# Patient Record
Sex: FEMALE | Race: WHITE | NOT HISPANIC OR LATINO | URBAN - METROPOLITAN AREA
[De-identification: names, ages, dates, MRNs, and addresses within clinical notes are randomized per-mention and may not be internally consistent; named-entity substitution may affect disease eponyms.]

---

## 2023-04-20 ENCOUNTER — OFFICE VISIT (OUTPATIENT)
Dept: PRIMARY CARE | Facility: CLINIC | Age: 23
End: 2023-04-20
Payer: COMMERCIAL

## 2023-04-20 VITALS
HEIGHT: 69 IN | TEMPERATURE: 96.7 F | OXYGEN SATURATION: 98 % | BODY MASS INDEX: 18.96 KG/M2 | DIASTOLIC BLOOD PRESSURE: 70 MMHG | SYSTOLIC BLOOD PRESSURE: 116 MMHG | HEART RATE: 72 BPM | WEIGHT: 128 LBS

## 2023-04-20 DIAGNOSIS — Z13.21 ENCOUNTER FOR VITAMIN DEFICIENCY SCREENING: ICD-10-CM

## 2023-04-20 DIAGNOSIS — Z30.09 BIRTH CONTROL COUNSELING: ICD-10-CM

## 2023-04-20 DIAGNOSIS — L74.4 ANHYDROSIS: ICD-10-CM

## 2023-04-20 DIAGNOSIS — Z13.220 SCREENING, LIPID: ICD-10-CM

## 2023-04-20 DIAGNOSIS — G89.29 CHRONIC BILATERAL THORACIC BACK PAIN: Primary | ICD-10-CM

## 2023-04-20 DIAGNOSIS — F41.9 ANXIETY: ICD-10-CM

## 2023-04-20 DIAGNOSIS — M54.6 CHRONIC BILATERAL THORACIC BACK PAIN: Primary | ICD-10-CM

## 2023-04-20 PROCEDURE — 99204 OFFICE O/P NEW MOD 45 MIN: CPT | Performed by: FAMILY MEDICINE

## 2023-04-20 RX ORDER — CYCLOBENZAPRINE HCL 5 MG
5 TABLET ORAL NIGHTLY PRN
Qty: 30 TABLET | Refills: 0 | Status: SHIPPED | OUTPATIENT
Start: 2023-04-20 | End: 2023-06-19

## 2023-04-20 RX ORDER — CITALOPRAM 40 MG/1
1 TABLET, FILM COATED ORAL DAILY
COMMUNITY
Start: 2022-11-24 | End: 2024-04-24 | Stop reason: ALTCHOICE

## 2023-04-20 RX ORDER — NORETHINDRONE 0.35 MG/1
1 TABLET ORAL DAILY
Qty: 28 TABLET | Refills: 12 | Status: SHIPPED | OUTPATIENT
Start: 2023-04-20 | End: 2024-03-25

## 2023-04-20 RX ORDER — HYDROXYZINE HYDROCHLORIDE 50 MG/1
TABLET, FILM COATED ORAL
COMMUNITY
Start: 2023-02-03 | End: 2024-04-08 | Stop reason: SDUPTHER

## 2023-04-20 RX ORDER — NAPROXEN 500 MG/1
500 TABLET ORAL 2 TIMES DAILY PRN
Qty: 60 TABLET | Refills: 0 | Status: SHIPPED | OUTPATIENT
Start: 2023-04-20 | End: 2023-07-19

## 2023-04-20 RX ORDER — NORETHINDRONE 0.35 MG/1
1 TABLET ORAL DAILY
COMMUNITY
Start: 2023-01-11 | End: 2023-04-20 | Stop reason: SDUPTHER

## 2023-04-20 ASSESSMENT — ENCOUNTER SYMPTOMS: BACK PAIN: 1

## 2023-04-20 NOTE — PATIENT INSTRUCTIONS
Thoracic pain: Would benefit from OMT advised to schedule here in the office, get x-ray, physical therapy referral, naproxen as needed and Flexeril as needed  Anhidrosis: We will get screening lab work  Birth control: Rx refilled

## 2023-04-20 NOTE — PROGRESS NOTES
"  Assessment     ASSESSMENT/PLAN:      Problem List Items Addressed This Visit    None  Visit Diagnoses       Chronic bilateral thoracic back pain    -  Primary    Relevant Medications    cyclobenzaprine (Flexeril) 5 mg tablet    naproxen (Naprosyn) 500 mg tablet    Other Relevant Orders    XR thoracic spine 2 views    Referral to Physical Therapy    Anxiety        Anhydrosis        Relevant Orders    Tsh With Reflex To Free T4 If Abnormal    CBC and Auto Differential    Comprehensive Metabolic Panel    Screening, lipid        Relevant Orders    Lipid panel    Birth control counseling        Relevant Medications    norethindrone (Micronor) 0.35 mg tablet    Encounter for vitamin deficiency screening        Relevant Orders    Vitamin D 25-Hydroxy,Total            Patient Instructions:  Patient Instructions   Thoracic pain: Would benefit from OMT advised to schedule here in the office, get x-ray, physical therapy referral, naproxen as needed and Flexeril as needed  Anhidrosis: We will get screening lab work  Birth control: Rx refilled      Signed by: Claudia Aguayo,        FUTURE DIRECTION:   Review x-ray and PT recommendations, review labs, review old records    Subjective   SUBJECTIVE:     HPI : Patient is a 22 y.o. female who presents today for the following:     Back Pain   - ongoing for the past 2 years, was playing D2 volley ball in college   - states she was seen by chiropractor   - states that she has pain daily, sometimes affecting sleep   - pain is described achy tightness \"around spine\", located in upper back and sometimes shooting pain that at times seems to migrate to sternum  -Denies numbness, tingling, weakness, recent traumas  - deneis getting x-ray or getting PT  - in the past tried otc NSAIDs, was using muscle relaxer's    Anhidrosis  -States that she was diagnosed in hydrocyst in 2019 had full work-up including blood work and referral to neurology  -States that she noticed improvement since " anxiety is more controlled    Anxiety  - citalopram 40 mg   - hydroxyzine 50mg   - has psych through online     LMP  - was seeing physician online   - was given due to irregular periods   - has not gottne pap smear        Review of Systems   Musculoskeletal:  Positive for back pain.       Past Medical History:   Diagnosis Date    Anhidrosis     Anxiety     Menstrual periods irregular         Past Surgical History:   Procedure Laterality Date    WISDOM TOOTH EXTRACTION          Current Outpatient Medications   Medication Instructions    citalopram (CeleXA) 40 mg tablet 1 tablet, oral, Daily    cyclobenzaprine (FLEXERIL) 5 mg, oral, Nightly PRN    hydrOXYzine HCL (Atarax) 50 mg tablet TAKE 1-2 TABS BY MOUTH AT NIGHT AS NEEDED FOR INSOMNIA    naproxen (NAPROSYN) 500 mg, oral, 2 times daily PRN    norethindrone (MICRONOR) 0.35 mg, oral, Daily        No Known Allergies     Social History     Socioeconomic History    Marital status:      Spouse name: Not on file    Number of children: Not on file    Years of education: Not on file    Highest education level: Not on file   Occupational History    Occupation: [employer]   Tobacco Use    Smoking status: Never    Smokeless tobacco: Never   Vaping Use    Vaping status: Not on file   Substance and Sexual Activity    Alcohol use: Not on file    Drug use: Not on file    Sexual activity: Yes     Partners: Male   Other Topics Concern    Not on file   Social History Narrative    Not on file     Social Determinants of Health     Financial Resource Strain: Not on file   Food Insecurity: Not on file   Transportation Needs: Not on file   Physical Activity: Not on file   Stress: Not on file   Social Connections: Not on file   Intimate Partner Violence: Not on file   Housing Stability: Not on file        Family History   Problem Relation Name Age of Onset    Thyroid disease Mother          Objective     OBJECTIVE:     Vitals:    04/20/23 1016 04/20/23 1019   BP: 116/70  "116/70   Pulse:  72   Temp:  35.9 °C (96.7 °F)   SpO2:  98%   Weight:  58.1 kg (128 lb)   Height:  1.753 m (5' 9\")        Physical Exam  HENT:      Head: Normocephalic and atraumatic.      Nose: Nose normal.      Mouth/Throat:      Mouth: Mucous membranes are moist.   Eyes:      Pupils: Pupils are equal, round, and reactive to light.   Cardiovascular:      Rate and Rhythm: Normal rate and regular rhythm.      Pulses: Normal pulses.      Heart sounds: No murmur heard.  Pulmonary:      Effort: Pulmonary effort is normal.      Breath sounds: Normal breath sounds.   Abdominal:      Tenderness: There is no abdominal tenderness.   Musculoskeletal:         General: Normal range of motion.      Cervical back: Normal range of motion.      Comments: T6-8 with left-sided paraspinal hypertonicity, rotation to the left   Skin:     General: Skin is warm and dry.   Neurological:      Mental Status: She is alert.   Psychiatric:         Mood and Affect: Mood normal.           "

## 2023-05-18 DIAGNOSIS — M54.6 CHRONIC BILATERAL THORACIC BACK PAIN: ICD-10-CM

## 2023-05-18 DIAGNOSIS — G89.29 CHRONIC BILATERAL THORACIC BACK PAIN: ICD-10-CM

## 2023-05-18 RX ORDER — NAPROXEN 500 MG/1
500 TABLET ORAL 2 TIMES DAILY PRN
Qty: 60 TABLET | Refills: 0 | OUTPATIENT
Start: 2023-05-18 | End: 2023-08-16

## 2023-07-20 ENCOUNTER — APPOINTMENT (OUTPATIENT)
Dept: PRIMARY CARE | Facility: CLINIC | Age: 23
End: 2023-07-20

## 2024-02-08 ENCOUNTER — OFFICE VISIT (OUTPATIENT)
Dept: PRIMARY CARE | Facility: CLINIC | Age: 24
End: 2024-02-08
Payer: COMMERCIAL

## 2024-02-08 VITALS
RESPIRATION RATE: 16 BRPM | WEIGHT: 129 LBS | HEART RATE: 68 BPM | DIASTOLIC BLOOD PRESSURE: 69 MMHG | SYSTOLIC BLOOD PRESSURE: 113 MMHG | TEMPERATURE: 98 F | HEIGHT: 67 IN | OXYGEN SATURATION: 98 % | BODY MASS INDEX: 20.25 KG/M2

## 2024-02-08 DIAGNOSIS — R42 ORTHOSTATIC LIGHTHEADEDNESS: Primary | ICD-10-CM

## 2024-02-08 DIAGNOSIS — F41.9 ANXIETY: ICD-10-CM

## 2024-02-08 DIAGNOSIS — Z00.00 HEALTH CARE MAINTENANCE: ICD-10-CM

## 2024-02-08 DIAGNOSIS — Z83.49 FAMILY HISTORY OF THYROID DISEASE: ICD-10-CM

## 2024-02-08 PROCEDURE — 93000 ELECTROCARDIOGRAM COMPLETE: CPT | Performed by: STUDENT IN AN ORGANIZED HEALTH CARE EDUCATION/TRAINING PROGRAM

## 2024-02-08 PROCEDURE — 90471 IMMUNIZATION ADMIN: CPT | Performed by: STUDENT IN AN ORGANIZED HEALTH CARE EDUCATION/TRAINING PROGRAM

## 2024-02-08 PROCEDURE — 99214 OFFICE O/P EST MOD 30 MIN: CPT | Performed by: STUDENT IN AN ORGANIZED HEALTH CARE EDUCATION/TRAINING PROGRAM

## 2024-02-08 PROCEDURE — 90715 TDAP VACCINE 7 YRS/> IM: CPT | Performed by: STUDENT IN AN ORGANIZED HEALTH CARE EDUCATION/TRAINING PROGRAM

## 2024-02-08 ASSESSMENT — ENCOUNTER SYMPTOMS
NAUSEA: 0
WHEEZING: 0
DIZZINESS: 0
CONSTIPATION: 0
VOMITING: 0
DYSURIA: 0
SPEECH DIFFICULTY: 0
HEMATURIA: 0
NUMBNESS: 0
SHORTNESS OF BREATH: 0
WEAKNESS: 0
COUGH: 0
FEVER: 0
ABDOMINAL PAIN: 0
ACTIVITY CHANGE: 0

## 2024-02-08 NOTE — PROGRESS NOTES
"Subjective   Patient ID: Tejal Harris is a 23 y.o. female who presents for Establish Care.  HPI  Tejal is 23 years old with history of and hydrocyst, anxiety and depression is seeing me for the first time in our office to establish care  She requests  referrals for psychiatry and gynecology  Of note shediagnosed to have an hydrocyst in college when she had ongoing  was dizziness, chest pain and shortness of breath especially on exertion and sometimes standing up  Her medications include Celexa 40 mg for anxiety hydroxyzine 50 mg for sleep and she is on birth control for contraception as well as regulating her menstrual cycles              Past Medical History:   Diagnosis Date    Allergic Childhood    Anhidrosis     Anxiety     Depression 2023    Headache 2014    Menstrual periods irregular     Urinary tract infection 2023      Past Surgical History:   Procedure Laterality Date    FRACTURE SURGERY  Wrist 2010    WISDOM TOOTH EXTRACTION        Family History   Problem Relation Name Age of Onset    Thyroid disease Mother      Breast cancer Maternal Grandmother Tressa Garcia     Cancer Maternal Grandmother Tressa Garcia       Allergies   Allergen Reactions    Wool Runny nose          Occupation:     Review of Systems   Constitutional:  Negative for activity change and fever.   HENT:  Negative for congestion.    Respiratory:  Negative for cough, shortness of breath and wheezing.    Cardiovascular:  Negative for chest pain and leg swelling.   Gastrointestinal:  Negative for abdominal pain, constipation, nausea and vomiting.   Endocrine: Negative for cold intolerance.   Genitourinary:  Negative for dysuria, hematuria and urgency.   Neurological:  Negative for dizziness, speech difficulty, weakness and numbness.   Psychiatric/Behavioral:  Negative for self-injury and suicidal ideas.        Objective   Visit Vitals  /69   Pulse 68   Temp 36.7 °C (98 °F)   Resp 16   Ht 1.702 m (5' 7\")   Wt 58.5 kg (129 lb)   SpO2 98%   BMI " 20.20 kg/m²   Smoking Status Never   BSA 1.66 m²      Physical Exam  Constitutional:       Appearance: Normal appearance.   HENT:      Head: Normocephalic and atraumatic.      Nose: Nose normal.      Mouth/Throat:      Mouth: Mucous membranes are moist.   Eyes:      Conjunctiva/sclera: Conjunctivae normal.      Pupils: Pupils are equal, round, and reactive to light.   Cardiovascular:      Rate and Rhythm: Normal rate and regular rhythm.      Pulses: Normal pulses.      Heart sounds: Normal heart sounds. No murmur heard.     No gallop.   Pulmonary:      Effort: Pulmonary effort is normal.      Breath sounds: Normal breath sounds.   Musculoskeletal:         General: Normal range of motion.      Cervical back: Neck supple.   Skin:     General: Skin is warm.   Neurological:      General: No focal deficit present.      Mental Status: She is alert and oriented to person, place, and time.   Psychiatric:         Mood and Affect: Mood normal.         Behavior: Behavior normal.         Thought Content: Thought content normal.         Judgment: Judgment normal.         Assessment/Plan   Diagnoses and all orders for this visit:  Orthostatic lightheadedness  Orthostatic vitals-  Lying down-heart rate of 57 with blood pressure 106/80  Standing up heart rate of 84 with a blood pressure 120/87.  Patient did endorse feeling dizzy on standing up  Given the above, I have suspicion for POTS.  In office EKG showed sinus bradycardia 56 bpm [patient is an athlete].  Advised to get blood work including thyroid, however we will get tilt table testing to rule out POTS.  Patient agreeable to plan.  Fall precautions discussed.  -     ECG 12 Lead  -     Autonomic Testing; Future  Health care maintenance  -     CBC; Future  -     Lipid Panel; Future  -     Comprehensive Metabolic Panel; Future  -     Referral to Gynecology; Future  Anxiety  No suicidal or homicidal ideation endorsed  Reports to be doing well with Celexa 40 mg  Psychiatry  referral placed  -     Referral to Psychiatry; Future  Family history of thyroid disease  -     TSH with reflex to Free T4 if abnormal; Future  Also agreeable to get routine blood work and tetanus vaccine.  She will check on her records for other vaccinations including HPV and let us know  .-     TSH with reflex to Free T4 if abnormal; Futur  e-     Tdap vaccine, age 7 years and older  (BOOSTRIX)

## 2024-03-13 ENCOUNTER — APPOINTMENT (OUTPATIENT)
Dept: BEHAVIORAL HEALTH | Facility: CLINIC | Age: 24
End: 2024-03-13
Payer: COMMERCIAL

## 2024-03-19 ENCOUNTER — LAB (OUTPATIENT)
Dept: LAB | Facility: LAB | Age: 24
End: 2024-03-19
Payer: COMMERCIAL

## 2024-03-19 DIAGNOSIS — Z00.00 HEALTH CARE MAINTENANCE: ICD-10-CM

## 2024-03-19 DIAGNOSIS — Z83.49 FAMILY HISTORY OF THYROID DISEASE: ICD-10-CM

## 2024-03-19 DIAGNOSIS — F41.9 ANXIETY: ICD-10-CM

## 2024-03-19 LAB
ALBUMIN SERPL BCP-MCNC: 4.6 G/DL (ref 3.4–5)
ALP SERPL-CCNC: 60 U/L (ref 33–110)
ALT SERPL W P-5'-P-CCNC: 13 U/L (ref 7–45)
ANION GAP SERPL CALC-SCNC: 9 MMOL/L (ref 10–20)
AST SERPL W P-5'-P-CCNC: 14 U/L (ref 9–39)
BILIRUB SERPL-MCNC: 1 MG/DL (ref 0–1.2)
BUN SERPL-MCNC: 11 MG/DL (ref 6–23)
CALCIUM SERPL-MCNC: 9.8 MG/DL (ref 8.6–10.6)
CHLORIDE SERPL-SCNC: 105 MMOL/L (ref 98–107)
CHOLEST SERPL-MCNC: 185 MG/DL (ref 0–199)
CHOLESTEROL/HDL RATIO: 2.2
CO2 SERPL-SCNC: 29 MMOL/L (ref 21–32)
CREAT SERPL-MCNC: 0.81 MG/DL (ref 0.5–1.05)
EGFRCR SERPLBLD CKD-EPI 2021: >90 ML/MIN/1.73M*2
ERYTHROCYTE [DISTWIDTH] IN BLOOD BY AUTOMATED COUNT: 12.6 % (ref 11.5–14.5)
GLUCOSE SERPL-MCNC: 97 MG/DL (ref 74–99)
HCT VFR BLD AUTO: 39.1 % (ref 36–46)
HDLC SERPL-MCNC: 85.3 MG/DL
HGB BLD-MCNC: 12.6 G/DL (ref 12–16)
LDLC SERPL CALC-MCNC: 89 MG/DL
MCH RBC QN AUTO: 29.1 PG (ref 26–34)
MCHC RBC AUTO-ENTMCNC: 32.2 G/DL (ref 32–36)
MCV RBC AUTO: 90 FL (ref 80–100)
NON HDL CHOLESTEROL: 100 MG/DL (ref 0–149)
NRBC BLD-RTO: 0 /100 WBCS (ref 0–0)
PLATELET # BLD AUTO: 288 X10*3/UL (ref 150–450)
POTASSIUM SERPL-SCNC: 4.2 MMOL/L (ref 3.5–5.3)
PROT SERPL-MCNC: 7 G/DL (ref 6.4–8.2)
RBC # BLD AUTO: 4.33 X10*6/UL (ref 4–5.2)
SODIUM SERPL-SCNC: 139 MMOL/L (ref 136–145)
TRIGL SERPL-MCNC: 56 MG/DL (ref 0–149)
TSH SERPL-ACNC: 1.69 MIU/L (ref 0.44–3.98)
VLDL: 11 MG/DL (ref 0–40)
WBC # BLD AUTO: 5.6 X10*3/UL (ref 4.4–11.3)

## 2024-03-19 PROCEDURE — 36415 COLL VENOUS BLD VENIPUNCTURE: CPT

## 2024-03-19 PROCEDURE — 84443 ASSAY THYROID STIM HORMONE: CPT

## 2024-03-19 PROCEDURE — 80053 COMPREHEN METABOLIC PANEL: CPT

## 2024-03-19 PROCEDURE — 85027 COMPLETE CBC AUTOMATED: CPT

## 2024-03-19 PROCEDURE — 80061 LIPID PANEL: CPT

## 2024-03-20 ENCOUNTER — OFFICE VISIT (OUTPATIENT)
Dept: OBSTETRICS AND GYNECOLOGY | Facility: CLINIC | Age: 24
End: 2024-03-20
Payer: COMMERCIAL

## 2024-03-20 ENCOUNTER — HOSPITAL ENCOUNTER (OUTPATIENT)
Dept: NEUROLOGY | Facility: CLINIC | Age: 24
Discharge: HOME | End: 2024-03-20
Payer: COMMERCIAL

## 2024-03-20 VITALS
HEIGHT: 69 IN | DIASTOLIC BLOOD PRESSURE: 60 MMHG | BODY MASS INDEX: 19.26 KG/M2 | WEIGHT: 130 LBS | SYSTOLIC BLOOD PRESSURE: 100 MMHG

## 2024-03-20 DIAGNOSIS — Z12.4 CERVICAL CANCER SCREENING: ICD-10-CM

## 2024-03-20 DIAGNOSIS — Z30.011 ENCOUNTER FOR INITIAL PRESCRIPTION OF CONTRACEPTIVE PILLS: ICD-10-CM

## 2024-03-20 DIAGNOSIS — Z01.419 WELL WOMAN EXAM: Primary | ICD-10-CM

## 2024-03-20 DIAGNOSIS — R42 ORTHOSTATIC LIGHTHEADEDNESS: ICD-10-CM

## 2024-03-20 DIAGNOSIS — Z00.00 HEALTH CARE MAINTENANCE: ICD-10-CM

## 2024-03-20 PROCEDURE — 95924 ANS PARASYMP & SYMP W/TILT: CPT | Performed by: PSYCHIATRY & NEUROLOGY

## 2024-03-20 PROCEDURE — 95923 AUTONOMIC NRV SYST FUNJ TEST: CPT | Performed by: PSYCHIATRY & NEUROLOGY

## 2024-03-20 PROCEDURE — 99385 PREV VISIT NEW AGE 18-39: CPT | Performed by: OBSTETRICS & GYNECOLOGY

## 2024-03-20 PROCEDURE — 1036F TOBACCO NON-USER: CPT | Performed by: OBSTETRICS & GYNECOLOGY

## 2024-03-20 PROCEDURE — 88175 CYTOPATH C/V AUTO FLUID REDO: CPT

## 2024-03-20 ASSESSMENT — ENCOUNTER SYMPTOMS
BACK PAIN: 0
BLOOD IN STOOL: 0
FATIGUE: 0
CONSTIPATION: 0
COLOR CHANGE: 0
SHORTNESS OF BREATH: 0
UNEXPECTED WEIGHT CHANGE: 0
NAUSEA: 0
SLEEP DISTURBANCE: 0
DYSURIA: 0
ABDOMINAL PAIN: 0
FEVER: 0
APPETITE CHANGE: 0
DIARRHEA: 0
FREQUENCY: 0
VOMITING: 0
HEMATURIA: 0
FLANK PAIN: 0
CHILLS: 0
ABDOMINAL DISTENTION: 0

## 2024-03-20 ASSESSMENT — PAIN SCALES - GENERAL: PAINLEVEL: 0-NO PAIN

## 2024-03-20 NOTE — PROGRESS NOTES
Tejal Harris is a 23 y.o.  here for well woman exam.    Medical and surgical histories reviewed with patient.   Pt has h/o significant lightheaded and dizziness and near syncope. She recently established with a new PCP And is starting to have some testing for POTS, etc.   Has h/o migraines. Currently once per month. Does have vision disturbances with migraines and can lead to near syncope. Unsure about diagnosis of aura.    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    GynHx:  Menarche: 11 yo   Cycles: slightly irregular, usually once a month and mostly normal flow but not exact or exactly the same every time ; pt has been on POP for the last 3 years and typically no cycles but sometimes but a lot of BTB when she does not take it on time  Sexually active: yes with one male partner   Contraception: POP  Pap History: never had a pap smear  STI History: denies h/o STI  Unsure if she had HPV vaccine    Patient's last menstrual period was 2024 (approximate).     Obstetric History  OB History    Para Term  AB Living   0 0 0 0 0 0   SAB IAB Ectopic Multiple Live Births   0 0 0 0 0        Past Medical History  She has a past medical history of Allergic (Childhood), Anhidrosis, Anxiety, Depression (), Headache (), Menstrual periods irregular, Panic attack (), Panic disorder (), Sleep difficulties (), and Urinary tract infection ().    Surgical History  She has a past surgical history that includes [place] tooth extraction and Fracture surgery (Wrist ).     Social History  She reports that she has never smoked. She has never used smokeless tobacco. She reports current alcohol use of about 1.0 - 3.0 standard drink of alcohol per week. She reports that she does not use drugs.    Family History  Family History   Problem Relation Name Age of Onset    Thyroid disease  "Mother      Breast cancer Maternal Grandmother Tressa Garcia     Cancer Maternal Grandmother Tressa Garcia     Anxiety disorder Paternal Grandmother Sydnie Aguilar     Dementia Paternal Grandmother Sydnie Aguilar     Depression Brother Todd Aguilar     Depression Sister Sydnie Aguilar     Schizophrenia Father's Brother Daniel Aguilar JR         Allergies  Wool    Review of Systems   Constitutional:  Negative for appetite change, chills, fatigue, fever and unexpected weight change.   Respiratory:  Negative for shortness of breath.    Cardiovascular:  Negative for chest pain.   Gastrointestinal:  Negative for abdominal distention, abdominal pain, blood in stool, constipation, diarrhea, nausea and vomiting.   Endocrine: Negative for cold intolerance and heat intolerance.   Genitourinary:  Negative for dyspareunia, dysuria, flank pain, frequency, genital sores, hematuria, menstrual problem, pelvic pain, urgency, vaginal bleeding, vaginal discharge and vaginal pain.   Musculoskeletal:  Negative for back pain.   Skin:  Negative for color change.   Psychiatric/Behavioral:  Negative for sleep disturbance.        /60 (BP Location: Left arm, Patient Position: Sitting)   Ht 1.753 m (5' 9\")   Wt 59 kg (130 lb)   LMP 01/22/2024 (Approximate)   BMI 19.20 kg/m²      Physical Exam  Constitutional:       Appearance: Normal appearance.   HENT:      Head: Normocephalic and atraumatic.   Chest:   Breasts:     Right: Normal.      Left: Normal.   Abdominal:      General: Abdomen is flat.      Palpations: Abdomen is soft.      Tenderness: There is no abdominal tenderness.   Genitourinary:     General: Normal vulva.      Vagina: Normal.      Cervix: Normal.      Uterus: Normal.       Adnexa: Right adnexa normal and left adnexa normal.      Comments: Pap collected today    Skin:     General: Skin is warm and dry.   Neurological:      Mental Status: She is alert and oriented to person, place, and time.   Psychiatric:       "   Mood and Affect: Mood normal.           Assessment and Plan:  Routine Well Woman Exam Today.   Discussed diet and exercise and routine health screening.   Pap: pap done today   Pt declines STI Screening    Contraception  I suspect patient was placed on POP d/t concern for migraines with aura. We discussed that NIAC can increase risk of blood clot/stroke in women who are experiencing migraine with aura. We reviewed other methods of  progesterone only birth control. Will try slynd.  Pt will continue with current workup and and we will revisit Contraception issue in a few months if needed.     Pt will check with her mom regarding HPV vaccine.     No orders of the defined types were placed in this encounter.

## 2024-03-25 DIAGNOSIS — Z30.09 BIRTH CONTROL COUNSELING: ICD-10-CM

## 2024-03-25 RX ORDER — NORETHINDRONE 0.35 MG/1
1 TABLET ORAL DAILY
Qty: 84 TABLET | Refills: 4 | Status: SHIPPED | OUTPATIENT
Start: 2024-03-25 | End: 2024-05-21

## 2024-03-26 ENCOUNTER — TELEPHONE (OUTPATIENT)
Dept: PRIMARY CARE | Facility: CLINIC | Age: 24
End: 2024-03-26
Payer: COMMERCIAL

## 2024-03-26 DIAGNOSIS — G90.A POTS (POSTURAL ORTHOSTATIC TACHYCARDIA SYNDROME): Primary | ICD-10-CM

## 2024-03-26 NOTE — TELEPHONE ENCOUNTER
Result Communication    Resulted Orders   Autonomic Testing    Impression    Heart rate response to deep breathing is normal via the mean heart rate range and the E:I ratio.  Heart rate response to the Valsalva Maneuver (VM), as assessed by the Valsalva Ratio (VR), is normal as are the blood pressure responses to phase II and phase IV of the maneuver.  During 11 minutes of 70 degrees head-up tilt, heart rate (HR) disclosed a significant increment > 30 bpm at several moments during the tilt, some of those were consecutive. Blood pressure (BP) responses were normal. The patient experienced significant lightheadedness and feeling warm. At  minute 11 into the tilt, HR decremented abruptly without a decrement in BP and the patient felt presyncopal. The tilt was reversed. HR was lower than baseline upon reversal of the tilt. QSART response at the foot is low or absent but the responses at the distal leg, proximal leg and forearm are normal.      This is an abnormal cardiovascular autonomic test panel, due the presence of a symptomatic accentuated orthostatic tachycardia, consistent with the diagnosis of Postural Orthostatic Tachycardia Syndrome (POTS), followed by a cardiovagal event that could have turned into a vasovagal event with longer tilt. There is no evidence of a significant cardiovagal or cardiovascular adrenergic abnormality on the cardioautonomic reflex tests. Specifically, there is no evidence of orthostatic hypotension.   In addition, the QSART findings are consistent with a postganglionic sympathetic sudomotor abnormality like that seen in autonomic/small fiber neuropathy.     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    Jim Wallace MD                 12:35 PM      Results were successfully communicated with the patient and they acknowledged their understanding.  Discussed hydration, salt intake and compressive garments.  Patient verbalizes understanding to see cardiology to  discuss pharmacological options

## 2024-03-29 LAB
CYTOLOGY CMNT CVX/VAG CYTO-IMP: NORMAL
LAB AP HPV GENOTYPE QUESTION: YES
LAB AP HPV HR: NORMAL
LABORATORY COMMENT REPORT: NORMAL
LMP START DATE: NORMAL
PATH REPORT.TOTAL CANCER: NORMAL

## 2024-04-08 DIAGNOSIS — F41.9 ANXIETY: ICD-10-CM

## 2024-04-09 RX ORDER — HYDROXYZINE HYDROCHLORIDE 50 MG/1
TABLET, FILM COATED ORAL
Qty: 30 TABLET | Refills: 0 | Status: SHIPPED | OUTPATIENT
Start: 2024-04-09 | End: 2024-04-24 | Stop reason: SDUPTHER

## 2024-04-24 ENCOUNTER — TELEMEDICINE (OUTPATIENT)
Dept: PRIMARY CARE | Facility: CLINIC | Age: 24
End: 2024-04-24
Payer: COMMERCIAL

## 2024-04-24 DIAGNOSIS — F41.9 ANXIETY: ICD-10-CM

## 2024-04-24 DIAGNOSIS — G47.00 INSOMNIA, UNSPECIFIED TYPE: Primary | ICD-10-CM

## 2024-04-24 PROCEDURE — 99214 OFFICE O/P EST MOD 30 MIN: CPT | Performed by: STUDENT IN AN ORGANIZED HEALTH CARE EDUCATION/TRAINING PROGRAM

## 2024-04-24 RX ORDER — FLUVOXAMINE MALEATE 25 MG/1
25 TABLET ORAL NIGHTLY
Qty: 30 TABLET | Refills: 1 | Status: SHIPPED | OUTPATIENT
Start: 2024-04-24 | End: 2024-05-01

## 2024-04-24 RX ORDER — RAMELTEON 8 MG/1
8 TABLET ORAL NIGHTLY
Qty: 30 TABLET | Refills: 2 | Status: SHIPPED | OUTPATIENT
Start: 2024-04-24 | End: 2024-07-23

## 2024-04-24 RX ORDER — HYDROXYZINE HYDROCHLORIDE 50 MG/1
50 TABLET, FILM COATED ORAL NIGHTLY PRN
Qty: 30 TABLET | Refills: 0 | Status: SHIPPED | OUTPATIENT
Start: 2024-04-24

## 2024-04-24 NOTE — PROGRESS NOTES
Subjective   Patient ID: Tejal Harris is a 23 y.o. female who presents for No chief complaint on file..  HPI  Virtual or Telephone Consent    An interactive audio and video telecommunication system which permits real time communications between the patient (at the originating site) and provider (at the distant site) was utilized to provide this telehealth service.   Verbal consent was requested and obtained from Tejal Harris on this date, 4/ 24/2024 for a telehealth visit.     Tejal is seeing me along virtual visit for ongoing anxiety/depression and sleep issues  Reports to have had recent stressors including job stress and relationship issues.  Reports to be seeing a therapist.  Sleeps at around 10 AM every day and reports to take at least 1 to 1-1/2 hours to fall asleep..  Been going to therapy on Celexa for 2 years and hydroxyzine.    No suicidal or homicidal ideation endorsed.  No safety concerns reported        Past Medical History:   Diagnosis Date    Allergic Childhood    Anhidrosis     Anxiety     Depression 2023    Headache 2014    Menstrual periods irregular     Panic attack 2017    Panic disorder 2017    Sleep difficulties 2017    Urinary tract infection 2023      Past Surgical History:   Procedure Laterality Date    FRACTURE SURGERY  Wrist 2010    WISDOM TOOTH EXTRACTION        Family History   Problem Relation Name Age of Onset    Thyroid disease Mother      Breast cancer Maternal Grandmother Tressa Garcia     Cancer Maternal Grandmother Tressa Garcia     Anxiety disorder Paternal Grandmother Cathiecolby Aguilar     Dementia Paternal Grandmother CathieJanine Aguilar     Depression Brother Todd Aguilar     Depression Sister Sydnie Aguilar     Schizophrenia Father's Brother Daniel Aguilar JR       Allergies   Allergen Reactions    Wool Runny nose          Occupation:     Review of Systems   Constitutional:  Negative for activity change and fever.   HENT:  Negative for congestion.     Respiratory:  Negative for cough, shortness of breath and wheezing.    Cardiovascular:  Negative for chest pain and leg swelling.   Gastrointestinal:  Negative for abdominal pain, constipation, nausea and vomiting.   Endocrine: Negative for cold intolerance.   Genitourinary:  Negative for dysuria, hematuria and urgency.   Neurological:  Negative for dizziness, speech difficulty, weakness and numbness.   Psychiatric/Behavioral:  Positive for sleep disturbance. Negative for self-injury and suicidal ideas.        Objective   Visit Vitals  OB Status Having periods   Smoking Status Never      Physical Exam  Constitutional:       Comments: Physical examination virtual visit is limited.   HENT:      Head: Normocephalic.      Nose: Nose normal.   Eyes:      Extraocular Movements: Extraocular movements intact.   Pulmonary:      Effort: Pulmonary effort is normal.   Musculoskeletal:      Cervical back: Normal range of motion.   Neurological:      Mental Status: She is alert and oriented to person, place, and time.   Psychiatric:         Mood and Affect: Mood normal.         Behavior: Behavior normal.         Thought Content: Thought content normal.         Judgment: Judgment normal.         Assessment/Plan   Diagnoses and all orders for this visit:  Insomnia, unspecified type  -     hydrOXYzine HCL (Atarax) 50 mg tablet; Take 1 tablet (50 mg) by mouth as needed at bedtime for anxiety.  -     ramelteon (Rozerem) 8 mg tablet; Take 1 tablet (8 mg) by mouth once daily at bedtime.  Anxiety  -     hydrOXYzine HCL (Atarax) 50 mg tablet; Take 1 tablet (50 mg) by mouth as needed at bedtime for anxiety.  We discussed on various options including ramelteon for sleep.  We discussed about trying fluvoxamine [switching from Celexa].  Benefits risks discussed.  Patient verbalizes understanding of benefits and risks. Agreeable to plan We will call the patient with abnormal labs if any and discuss necessary changes based on labs; otherwise  patient to follow up in 1 month for anxiety/insomnia.  To call back if patient has any side effects.

## 2024-04-29 DIAGNOSIS — F41.9 ANXIETY: Primary | ICD-10-CM

## 2024-05-01 RX ORDER — CITALOPRAM 40 MG/1
40 TABLET, FILM COATED ORAL DAILY
Qty: 90 TABLET | Refills: 0 | Status: SHIPPED | OUTPATIENT
Start: 2024-05-01 | End: 2024-07-30

## 2024-05-15 ASSESSMENT — ENCOUNTER SYMPTOMS
SPEECH DIFFICULTY: 0
ABDOMINAL PAIN: 0
SHORTNESS OF BREATH: 0
FEVER: 0
NUMBNESS: 0
DYSURIA: 0
NAUSEA: 0
CONSTIPATION: 0
WEAKNESS: 0
WHEEZING: 0
VOMITING: 0
ACTIVITY CHANGE: 0
COUGH: 0
SLEEP DISTURBANCE: 1
HEMATURIA: 0
DIZZINESS: 0

## 2024-05-17 PROBLEM — G89.29 CHRONIC THORACIC BACK PAIN: Status: ACTIVE | Noted: 2023-05-03

## 2024-05-17 PROBLEM — F41.9 ANXIETY: Status: ACTIVE | Noted: 2024-05-17

## 2024-05-17 PROBLEM — M54.6 CHRONIC THORACIC BACK PAIN: Status: ACTIVE | Noted: 2023-05-03

## 2024-05-21 ENCOUNTER — OFFICE VISIT (OUTPATIENT)
Dept: CARDIOLOGY | Facility: CLINIC | Age: 24
End: 2024-05-21
Payer: COMMERCIAL

## 2024-05-21 VITALS
HEIGHT: 68 IN | BODY MASS INDEX: 19.52 KG/M2 | OXYGEN SATURATION: 99 % | WEIGHT: 128.8 LBS | SYSTOLIC BLOOD PRESSURE: 119 MMHG | HEART RATE: 65 BPM | DIASTOLIC BLOOD PRESSURE: 66 MMHG

## 2024-05-21 DIAGNOSIS — G90.A POTS (POSTURAL ORTHOSTATIC TACHYCARDIA SYNDROME): ICD-10-CM

## 2024-05-21 PROCEDURE — 1036F TOBACCO NON-USER: CPT | Performed by: NURSE PRACTITIONER

## 2024-05-21 PROCEDURE — 99204 OFFICE O/P NEW MOD 45 MIN: CPT | Performed by: NURSE PRACTITIONER

## 2024-05-21 PROCEDURE — 99214 OFFICE O/P EST MOD 30 MIN: CPT | Performed by: NURSE PRACTITIONER

## 2024-05-21 ASSESSMENT — ENCOUNTER SYMPTOMS
CONSTITUTIONAL NEGATIVE: 1
DEPRESSION: 0
CARDIOVASCULAR NEGATIVE: 1
GASTROINTESTINAL NEGATIVE: 1
RESPIRATORY NEGATIVE: 1
LOSS OF SENSATION IN FEET: 0
MUSCULOSKELETAL NEGATIVE: 1
OCCASIONAL FEELINGS OF UNSTEADINESS: 0

## 2024-05-21 ASSESSMENT — PAIN SCALES - GENERAL: PAINLEVEL: 0-NO PAIN

## 2024-05-21 NOTE — PROGRESS NOTES
Chief Complaint:   No chief complaint on file.    History Of Present Illness:    .Ms Harris is here for cardiac consult for POTS.  Had positive tilt table testing.  Denies chest pain, sob, palpitations or pedal edema.           Last Recorded Vitals:  There were no vitals taken for this visit.     Past Medical History:  Past Medical History:   Diagnosis Date    Allergic Childhood    Anhidrosis     Anxiety     Depression 2023    Headache 2014    Menstrual periods irregular     Panic attack 2017    Panic disorder 2017    Sleep difficulties 2017    Urinary tract infection 2023        Past Surgical History:  Past Surgical History:   Procedure Laterality Date    FRACTURE SURGERY  Wrist 2010    WISDOM TOOTH EXTRACTION         Social History:  Social History     Socioeconomic History    Marital status:      Spouse name: None    Number of children: None    Years of education: None    Highest education level: None   Occupational History    Occupation: [employer]   Tobacco Use    Smoking status: Never    Smokeless tobacco: Never   Vaping Use    Vaping status: Never Used   Substance and Sexual Activity    Alcohol use: Yes     Alcohol/week: 1.0 - 3.0 standard drink of alcohol     Types: 1 - 3 Standard drinks or equivalent per week     Comment: Casual social drinking    Drug use: Never    Sexual activity: Yes     Partners: Male     Birth control/protection: Condom Male, Other     Comment: Birth Control Pill   Other Topics Concern    None   Social History Narrative    None     Social Determinants of Health     Financial Resource Strain: Not on file   Food Insecurity: Not on file   Transportation Needs: Not on file   Physical Activity: Not on file   Stress: Not on file   Social Connections: Not on file   Intimate Partner Violence: Not on file   Housing Stability: Not on file       Family History:  Family History   Problem Relation Name Age of Onset    Thyroid disease Mother      Breast cancer Maternal Grandmother Tressa  Jose     Cancer Maternal Grandmother Tressa Garcia     Anxiety disorder Paternal Grandmother Sydnie Aguilar     Dementia Paternal Grandmother Sydnie Aguilar     Depression Brother Todd Aguilar     Depression Sister Sydnie Aguilar     Schizophrenia Father's Brother Daniel Aguilar JR          Allergies:  Wool    Outpatient Medications:  Current Outpatient Medications   Medication Sig Dispense Refill    citalopram (CeleXA) 40 mg tablet Take 1 tablet (40 mg) by mouth once daily. 90 tablet 0    drospirenone, contraceptive, 4 mg (28) tablet Take 1 tablet by mouth once daily. 90 tablet 3    hydrOXYzine HCL (Atarax) 50 mg tablet Take 1 tablet (50 mg) by mouth as needed at bedtime for anxiety. 30 tablet 0    ramelteon (Rozerem) 8 mg tablet Take 1 tablet (8 mg) by mouth once daily at bedtime. 30 tablet 2    norethindrone (Micronor) 0.35 mg tablet TAKE 1 TABLET BY MOUTH ONCE DAILY. (Patient not taking: Reported on 5/21/2024) 84 tablet 4     No current facility-administered medications for this visit.        Physical Exam:  Cardiovascular:      PMI at left midclavicular line. Normal rate. Regular rhythm. Normal S1. Normal S2.       Murmurs: There is no murmur.      No gallop.  No click. No rub.   Pulses:     Intact distal pulses.   Edema:     Peripheral edema absent.         ROS:  Review of Systems   Constitutional: Negative.   Cardiovascular: Negative.    Respiratory: Negative.     Skin: Negative.    Musculoskeletal: Negative.    Gastrointestinal: Negative.    Genitourinary: Negative.           Last Labs:  CBC -  Lab Results   Component Value Date    WBC 5.6 03/19/2024    HGB 12.6 03/19/2024    HCT 39.1 03/19/2024    MCV 90 03/19/2024     03/19/2024       CMP -  Lab Results   Component Value Date    CALCIUM 9.8 03/19/2024    PROT 7.0 03/19/2024    ALBUMIN 4.6 03/19/2024    AST 14 03/19/2024    ALT 13 03/19/2024    ALKPHOS 60 03/19/2024    BILITOT 1.0 03/19/2024       LIPID PANEL -   Lab Results   Component  "Value Date    CHOL 185 03/19/2024    TRIG 56 03/19/2024    HDL 85.3 03/19/2024    CHHDL 2.2 03/19/2024    VLDL 11 03/19/2024    NHDL 100 03/19/2024       RENAL FUNCTION PANEL -   Lab Results   Component Value Date    GLUCOSE 97 03/19/2024     03/19/2024    K 4.2 03/19/2024     03/19/2024    CO2 29 03/19/2024    ANIONGAP 9 (L) 03/19/2024    BUN 11 03/19/2024    CREATININE 0.81 03/19/2024    CALCIUM 9.8 03/19/2024    ALBUMIN 4.6 03/19/2024        No results found for: \"BNP\", \"HGBA1C\"      Assessment/Plan   Problem List Items Addressed This Visit    None  Visit Diagnoses       POTS (postural orthostatic tachycardia syndrome)               POTS.    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   See neuro.  Will obtain echo.      Mandi Howe, APRN-CNP  "

## 2024-06-03 ENCOUNTER — APPOINTMENT (OUTPATIENT)
Dept: CARDIOLOGY | Facility: CLINIC | Age: 24
End: 2024-06-03
Payer: COMMERCIAL

## 2024-06-14 DIAGNOSIS — R30.0 DYSURIA: Primary | ICD-10-CM

## 2024-06-17 ENCOUNTER — HOSPITAL ENCOUNTER (OUTPATIENT)
Dept: CARDIOLOGY | Facility: CLINIC | Age: 24
Discharge: HOME | End: 2024-06-17
Payer: COMMERCIAL

## 2024-06-17 DIAGNOSIS — G90.A POTS (POSTURAL ORTHOSTATIC TACHYCARDIA SYNDROME): ICD-10-CM

## 2024-06-17 DIAGNOSIS — Z01.810 ENCOUNTER FOR PREPROCEDURAL CARDIOVASCULAR EXAMINATION: ICD-10-CM

## 2024-06-17 LAB
AORTIC VALVE PEAK VELOCITY: 1 M/S
AV PEAK GRADIENT: 4 MMHG
AVA (PEAK VEL): 2.55 CM2
EJECTION FRACTION APICAL 4 CHAMBER: 60.8
LEFT ATRIUM VOLUME AREA LENGTH INDEX BSA: 22.4 ML/M2
LEFT VENTRICLE INTERNAL DIMENSION DIASTOLE: 4.72 CM (ref 3.5–6)
LEFT VENTRICULAR OUTFLOW TRACT DIAMETER: 2.02 CM
LV EJECTION FRACTION BIPLANE: 61 %
MITRAL VALVE E/A RATIO: 3.4
MITRAL VALVE E/E' RATIO: 4.25
RIGHT VENTRICLE FREE WALL PEAK S': 10.92 CM/S
RIGHT VENTRICLE PEAK SYSTOLIC PRESSURE: 17.7 MMHG
TRICUSPID ANNULAR PLANE SYSTOLIC EXCURSION: 2.1 CM

## 2024-06-17 PROCEDURE — 93306 TTE W/DOPPLER COMPLETE: CPT

## 2024-06-17 PROCEDURE — 93306 TTE W/DOPPLER COMPLETE: CPT | Performed by: INTERNAL MEDICINE

## 2024-06-21 DIAGNOSIS — F41.9 ANXIETY: ICD-10-CM

## 2024-06-21 DIAGNOSIS — G47.00 INSOMNIA, UNSPECIFIED TYPE: ICD-10-CM

## 2024-06-21 RX ORDER — HYDROXYZINE HYDROCHLORIDE 50 MG/1
50 TABLET, FILM COATED ORAL NIGHTLY PRN
Qty: 30 TABLET | Refills: 0 | Status: SHIPPED | OUTPATIENT
Start: 2024-06-21

## 2024-06-21 RX ORDER — RAMELTEON 8 MG/1
8 TABLET ORAL NIGHTLY
Qty: 30 TABLET | Refills: 2 | Status: SHIPPED | OUTPATIENT
Start: 2024-06-21 | End: 2024-09-19

## 2024-07-23 DIAGNOSIS — F41.9 ANXIETY: ICD-10-CM

## 2024-07-23 DIAGNOSIS — G47.00 INSOMNIA, UNSPECIFIED TYPE: ICD-10-CM

## 2024-07-25 RX ORDER — HYDROXYZINE HYDROCHLORIDE 50 MG/1
50 TABLET, FILM COATED ORAL NIGHTLY PRN
Qty: 30 TABLET | Refills: 0 | Status: SHIPPED | OUTPATIENT
Start: 2024-07-25

## 2024-07-25 RX ORDER — CITALOPRAM 40 MG/1
40 TABLET, FILM COATED ORAL DAILY
Qty: 90 TABLET | Refills: 0 | Status: SHIPPED | OUTPATIENT
Start: 2024-07-25 | End: 2024-10-23

## 2024-08-19 ENCOUNTER — TELEPHONE (OUTPATIENT)
Dept: OBSTETRICS AND GYNECOLOGY | Facility: CLINIC | Age: 24
End: 2024-08-19
Payer: COMMERCIAL

## 2024-08-19 DIAGNOSIS — G47.00 INSOMNIA, UNSPECIFIED TYPE: ICD-10-CM

## 2024-08-19 DIAGNOSIS — F41.9 ANXIETY: ICD-10-CM

## 2024-08-19 RX ORDER — HYDROXYZINE HYDROCHLORIDE 50 MG/1
50 TABLET, FILM COATED ORAL NIGHTLY PRN
Qty: 30 TABLET | Refills: 0 | Status: SHIPPED | OUTPATIENT
Start: 2024-08-19

## 2024-09-10 DIAGNOSIS — F41.9 ANXIETY: ICD-10-CM

## 2024-09-10 DIAGNOSIS — G47.00 INSOMNIA, UNSPECIFIED TYPE: ICD-10-CM

## 2024-09-10 RX ORDER — HYDROXYZINE HYDROCHLORIDE 50 MG/1
100 TABLET, FILM COATED ORAL NIGHTLY PRN
Qty: 60 TABLET | Refills: 1 | Status: SHIPPED | OUTPATIENT
Start: 2024-09-10 | End: 2024-11-09

## 2024-09-13 ENCOUNTER — APPOINTMENT (OUTPATIENT)
Dept: OBSTETRICS AND GYNECOLOGY | Facility: CLINIC | Age: 24
End: 2024-09-13
Payer: COMMERCIAL

## 2024-09-25 ENCOUNTER — APPOINTMENT (OUTPATIENT)
Dept: OBSTETRICS AND GYNECOLOGY | Facility: CLINIC | Age: 24
End: 2024-09-25
Payer: COMMERCIAL

## 2024-09-25 VITALS
DIASTOLIC BLOOD PRESSURE: 64 MMHG | HEIGHT: 69 IN | SYSTOLIC BLOOD PRESSURE: 90 MMHG | BODY MASS INDEX: 19.26 KG/M2 | WEIGHT: 130 LBS

## 2024-09-25 DIAGNOSIS — N92.6 IRREGULAR MENSTRUAL CYCLE: Primary | ICD-10-CM

## 2024-09-25 DIAGNOSIS — Z30.09 ENCOUNTER FOR OTHER GENERAL COUNSELING OR ADVICE ON CONTRACEPTION: ICD-10-CM

## 2024-09-25 PROCEDURE — 99213 OFFICE O/P EST LOW 20 MIN: CPT | Performed by: OBSTETRICS & GYNECOLOGY

## 2024-09-25 PROCEDURE — 3008F BODY MASS INDEX DOCD: CPT | Performed by: OBSTETRICS & GYNECOLOGY

## 2024-09-25 PROCEDURE — 1036F TOBACCO NON-USER: CPT | Performed by: OBSTETRICS & GYNECOLOGY

## 2024-09-25 ASSESSMENT — ENCOUNTER SYMPTOMS
COLOR CHANGE: 0
SLEEP DISTURBANCE: 0
CONSTIPATION: 0
BLOOD IN STOOL: 0
APPETITE CHANGE: 0
FREQUENCY: 0
FLANK PAIN: 0
DIARRHEA: 0
SHORTNESS OF BREATH: 0
NAUSEA: 0
FEVER: 0
VOMITING: 0
FATIGUE: 0
ABDOMINAL PAIN: 0
CHILLS: 0
BACK PAIN: 0
DYSURIA: 0
ABDOMINAL DISTENTION: 0
HEMATURIA: 0
UNEXPECTED WEIGHT CHANGE: 0

## 2024-09-25 ASSESSMENT — PAIN SCALES - GENERAL: PAINLEVEL: 0-NO PAIN

## 2024-09-25 NOTE — PROGRESS NOTES
"Tejal Harris is a 24 y.o.  here for follow up on birth control    Currently on Slynd and has been taking regularly for 5-6 months. Was previously on norethindrone POP. Has migraines with aura.   Continues to have irregular cycles on Slynd. Sometimes 4 days of bleeding, sometimes up to 2 weeks of bleeding.    Was formally diagnosed with POTS. No  formal treatment and no real lifestyle changes.   No other changes to medical issues.     Pt and  considering TTC in the next 1-2 years.     Past medical and surgical history reviewed.     Obstetric History  OB History    Para Term  AB Living   0 0 0 0 0 0   SAB IAB Ectopic Multiple Live Births   0 0 0 0 0        Past Medical History  She has a past medical history of Allergic (Childhood), Anhidrosis, Anxiety, Depression (), Headache (), Menstrual periods irregular, Panic attack (), Panic disorder (), Sleep difficulties (), and Urinary tract infection ().    Surgical History  She has a past surgical history that includes [place] tooth extraction and Fracture surgery (Wrist ).     Social History  She reports that she has never smoked. She has never used smokeless tobacco. She reports current alcohol use of about 1.0 - 3.0 standard drink of alcohol per week. She reports that she does not use drugs.    Family History  Family History   Problem Relation Name Age of Onset    Thyroid disease Mother      Breast cancer Maternal Grandmother Tressa Garcia     Cancer Maternal Grandmother Tressa Garcia     Anxiety disorder Paternal Grandmother CathieJanine Aguilar     Dementia Paternal Grandmother Sydnie Aguilar     Depression Brother Todd Aguilar     Depression Sister Sydnie Aguilar     Schizophrenia Father's Brother Daniel Jeff ANTHONY          Ht 1.753 m (5' 9\")   Wt 59 kg (130 lb)   LMP 2024 (Exact Date)   BMI 19.20 kg/m²   Patient's last menstrual period was 2024 (exact date).    Review of Systems "   Constitutional:  Negative for appetite change, chills, fatigue, fever and unexpected weight change.   Respiratory:  Negative for shortness of breath.    Cardiovascular:  Negative for chest pain.   Gastrointestinal:  Negative for abdominal distention, abdominal pain, blood in stool, constipation, diarrhea, nausea and vomiting.   Endocrine: Negative for cold intolerance and heat intolerance.   Genitourinary:  Positive for menstrual problem. Negative for dyspareunia, dysuria, flank pain, frequency, genital sores, hematuria, pelvic pain, urgency, vaginal bleeding, vaginal discharge and vaginal pain.   Musculoskeletal:  Negative for back pain.   Skin:  Negative for color change.   Psychiatric/Behavioral:  Negative for sleep disturbance.        Physical Exam  Constitutional:       Appearance: Normal appearance.   Skin:     General: Skin is warm and dry.   Neurological:      Mental Status: She is alert.   Psychiatric:         Mood and Affect: Mood normal.         Behavior: Behavior normal.           Assessment and Plan:    Irregular menstrual cycles on POP  Check TATV US to assess for structural issues  Discussed other birth control options.  Nexplanon and Depo may not be good choices if patient considering TTC in the next 1-2 years.  Pt would consider IUD but will decide if it is worth it for 1 year.

## 2024-11-25 DIAGNOSIS — F41.9 ANXIETY: ICD-10-CM

## 2024-11-25 DIAGNOSIS — G47.00 INSOMNIA, UNSPECIFIED TYPE: ICD-10-CM

## 2024-11-25 RX ORDER — HYDROXYZINE HYDROCHLORIDE 50 MG/1
100 TABLET, FILM COATED ORAL NIGHTLY PRN
Qty: 60 TABLET | Refills: 1 | Status: SHIPPED | OUTPATIENT
Start: 2024-11-25 | End: 2025-01-24

## 2024-11-26 ENCOUNTER — TELEPHONE (OUTPATIENT)
Dept: OBSTETRICS AND GYNECOLOGY | Facility: CLINIC | Age: 24
End: 2024-11-26
Payer: COMMERCIAL

## 2024-12-05 DIAGNOSIS — G47.00 INSOMNIA, UNSPECIFIED TYPE: ICD-10-CM

## 2024-12-05 DIAGNOSIS — F41.9 ANXIETY: ICD-10-CM

## 2024-12-05 RX ORDER — CITALOPRAM 40 MG/1
40 TABLET, FILM COATED ORAL DAILY
Qty: 90 TABLET | Refills: 0 | Status: SHIPPED | OUTPATIENT
Start: 2024-12-05 | End: 2025-03-05

## 2024-12-05 RX ORDER — HYDROXYZINE HYDROCHLORIDE 50 MG/1
100 TABLET, FILM COATED ORAL NIGHTLY PRN
Qty: 60 TABLET | Refills: 1 | Status: SHIPPED | OUTPATIENT
Start: 2024-12-05 | End: 2025-02-03

## 2025-03-20 ENCOUNTER — TELEPHONE (OUTPATIENT)
Dept: PRIMARY CARE | Facility: CLINIC | Age: 25
End: 2025-03-20
Payer: COMMERCIAL

## 2025-03-20 DIAGNOSIS — F41.9 ANXIETY: ICD-10-CM

## 2025-03-20 DIAGNOSIS — G47.00 INSOMNIA, UNSPECIFIED TYPE: ICD-10-CM

## 2025-03-20 RX ORDER — CITALOPRAM 40 MG/1
40 TABLET, FILM COATED ORAL DAILY
Qty: 90 TABLET | Refills: 0 | Status: SHIPPED | OUTPATIENT
Start: 2025-03-20 | End: 2025-06-18

## 2025-03-20 RX ORDER — HYDROXYZINE HYDROCHLORIDE 50 MG/1
100 TABLET, FILM COATED ORAL NIGHTLY PRN
Qty: 60 TABLET | Refills: 1 | Status: SHIPPED | OUTPATIENT
Start: 2025-03-20 | End: 2025-05-19

## 2025-03-21 ENCOUNTER — APPOINTMENT (OUTPATIENT)
Dept: OBSTETRICS AND GYNECOLOGY | Facility: CLINIC | Age: 25
End: 2025-03-21
Payer: COMMERCIAL

## 2025-05-28 ENCOUNTER — APPOINTMENT (OUTPATIENT)
Dept: PRIMARY CARE | Facility: CLINIC | Age: 25
End: 2025-05-28
Payer: COMMERCIAL

## 2025-06-13 ENCOUNTER — APPOINTMENT (OUTPATIENT)
Dept: PRIMARY CARE | Facility: CLINIC | Age: 25
End: 2025-06-13
Payer: COMMERCIAL

## 2025-06-13 DIAGNOSIS — G47.00 INSOMNIA, UNSPECIFIED TYPE: Primary | ICD-10-CM

## 2025-06-13 PROCEDURE — 99213 OFFICE O/P EST LOW 20 MIN: CPT | Performed by: STUDENT IN AN ORGANIZED HEALTH CARE EDUCATION/TRAINING PROGRAM

## 2025-06-13 RX ORDER — TRAZODONE HYDROCHLORIDE 50 MG/1
25 TABLET ORAL NIGHTLY PRN
Qty: 30 TABLET | Refills: 1 | Status: SHIPPED | OUTPATIENT
Start: 2025-06-13 | End: 2025-06-19 | Stop reason: SDUPTHER

## 2025-06-13 NOTE — PROGRESS NOTES
Subjective   Patient ID: Tejal Harris is a 24 y.o. female who presents for No chief complaint on file..  HPI  Virtual or Telephone Consent    An interactive audio and video telecommunication system which permits real time communications between the patient (at the originating site) and provider (at the distant site) was utilized to provide this telehealth service.   Verbal consent was requested and obtained from Tejal Harris on this date, 06/13/25 for a telehealth visit and the patient's location was confirmed at the time of the visit.  Medical History[1]   Surgical History[2]   Family History[3]   Allergies[4]       Occupation:     Review of Systems   Constitutional:  Negative for activity change and fever.   HENT:  Negative for congestion.    Respiratory:  Negative for cough, shortness of breath and wheezing.    Cardiovascular:  Negative for chest pain and leg swelling.   Gastrointestinal:  Negative for abdominal pain, constipation, nausea and vomiting.   Endocrine: Negative for cold intolerance.   Genitourinary:  Negative for dysuria, hematuria and urgency.   Neurological:  Negative for dizziness, speech difficulty, weakness and numbness.   Psychiatric/Behavioral:  Negative for self-injury and suicidal ideas.        Objective   Visit Vitals  OB Status Having periods   Smoking Status Never      Physical Exam  Constitutional:       Comments: Physical examination virtual visit is limited.   HENT:      Head: Normocephalic.      Nose: Nose normal.   Eyes:      Extraocular Movements: Extraocular movements intact.   Pulmonary:      Effort: Pulmonary effort is normal.   Musculoskeletal:      Cervical back: Normal range of motion.   Neurological:      Mental Status: She is alert and oriented to person, place, and time.   Psychiatric:         Mood and Affect: Mood normal.         Behavior: Behavior normal.         Thought Content: Thought content normal.         Judgment: Judgment normal.          Assessment/Plan   Diagnoses and all orders for this visit:  Insomnia, unspecified type  Reports use of hydroxyzine 50 mg every day for insomnia.  Advised to use it as needed.  Discussed other options including trazodone which she is agreeable to.  Trial of trazodone.  Benefits risks discussed including serotonin syndrome.  Call back if she has any side effects.  Verbalized understanding agreeable to plan            [1]   Past Medical History:  Diagnosis Date    Allergic Childhood    Anhidrosis     Anxiety     Depression 2023    Headache 2014    Menstrual periods irregular     Panic attack 2017    Panic disorder 2017    Sleep difficulties 2017    Urinary tract infection 2023   [2]   Past Surgical History:  Procedure Laterality Date    FRACTURE SURGERY  Wrist 2010    WISDOM TOOTH EXTRACTION     [3]   Family History  Problem Relation Name Age of Onset    Thyroid disease Mother      Breast cancer Maternal Grandmother Tressa Jose     Cancer Maternal Grandmother Tressa Garcia     Anxiety disorder Paternal Grandmother Sydnie Aguilar     Dementia Paternal Grandmother Sydnie Aguilar     Depression Brother Todd Aguilar     Depression Sister Sydnie Aguilar     Schizophrenia Father's Brother Daniel Aguilar JR    [4]   Allergies  Allergen Reactions    Wool Runny nose

## 2025-06-19 DIAGNOSIS — G47.00 INSOMNIA, UNSPECIFIED TYPE: ICD-10-CM

## 2025-06-23 RX ORDER — TRAZODONE HYDROCHLORIDE 50 MG/1
25 TABLET ORAL NIGHTLY PRN
Qty: 30 TABLET | Refills: 1 | Status: SHIPPED | OUTPATIENT
Start: 2025-06-23 | End: 2026-06-23

## 2025-06-29 ASSESSMENT — ENCOUNTER SYMPTOMS
HEMATURIA: 0
COUGH: 0
WEAKNESS: 0
NAUSEA: 0
ABDOMINAL PAIN: 0
DIZZINESS: 0
ACTIVITY CHANGE: 0
CONSTIPATION: 0
NUMBNESS: 0
SHORTNESS OF BREATH: 0
DYSURIA: 0
VOMITING: 0
FEVER: 0
WHEEZING: 0
SPEECH DIFFICULTY: 0

## 2025-07-09 ENCOUNTER — APPOINTMENT (OUTPATIENT)
Dept: PRIMARY CARE | Facility: CLINIC | Age: 25
End: 2025-07-09
Payer: COMMERCIAL

## 2025-08-07 DIAGNOSIS — F41.9 ANXIETY: ICD-10-CM

## 2025-08-08 RX ORDER — CITALOPRAM 40 MG/1
40 TABLET ORAL DAILY
Qty: 90 TABLET | Refills: 0 | Status: SHIPPED | OUTPATIENT
Start: 2025-08-08

## 2025-08-08 RX ORDER — CITALOPRAM 40 MG/1
40 TABLET ORAL DAILY
Qty: 90 TABLET | Refills: 0 | OUTPATIENT
Start: 2025-08-08

## 2025-08-26 DIAGNOSIS — F41.9 ANXIETY: ICD-10-CM

## 2025-08-26 DIAGNOSIS — G47.00 INSOMNIA, UNSPECIFIED TYPE: ICD-10-CM

## 2025-08-28 RX ORDER — HYDROXYZINE HYDROCHLORIDE 50 MG/1
100 TABLET, FILM COATED ORAL NIGHTLY PRN
Qty: 60 TABLET | Refills: 0 | Status: SHIPPED | OUTPATIENT
Start: 2025-08-28 | End: 2025-10-27